# Patient Record
Sex: FEMALE | Race: WHITE | URBAN - METROPOLITAN AREA
[De-identification: names, ages, dates, MRNs, and addresses within clinical notes are randomized per-mention and may not be internally consistent; named-entity substitution may affect disease eponyms.]

---

## 2022-07-11 ENCOUNTER — APPOINTMENT (OUTPATIENT)
Dept: URBAN - METROPOLITAN AREA SURGERY 18 | Age: 62
Setting detail: DERMATOLOGY
End: 2022-07-14

## 2022-07-11 VITALS — SYSTOLIC BLOOD PRESSURE: 133 MMHG | DIASTOLIC BLOOD PRESSURE: 73 MMHG | HEART RATE: 68 BPM

## 2022-07-11 PROBLEM — C44.319 BASAL CELL CARCINOMA OF SKIN OF OTHER PARTS OF FACE: Status: ACTIVE | Noted: 2022-07-11

## 2022-07-11 PROCEDURE — 14041 TIS TRNFR F/C/C/M/N/A/G/H/F: CPT

## 2022-07-11 PROCEDURE — OTHER MOHS SURGERY: OTHER

## 2022-07-11 PROCEDURE — OTHER REFERRAL CORRESPONDENCE: OTHER

## 2022-07-11 PROCEDURE — OTHER COUNSELING: OTHER

## 2022-07-11 PROCEDURE — 17311 MOHS 1 STAGE H/N/HF/G: CPT

## 2022-07-11 PROCEDURE — OTHER PRESCRIPTION: OTHER

## 2022-07-11 PROCEDURE — 17312 MOHS ADDL STAGE: CPT

## 2022-07-11 RX ORDER — OXYCODONE HYDROCHLORIDE AND ACETAMINOPHEN 5; 325 MG/1; MG/1
TABLET ORAL
Qty: 4 | Refills: 0 | Status: ERX | COMMUNITY
Start: 2022-07-11

## 2022-07-11 NOTE — PROCEDURE: MOHS SURGERY
Pt resting on stretcher with eyes closed Melolabial Interpolation Flap Text: A decision was made to reconstruct the defect utilizing an interpolation axial flap and a staged reconstruction.  A telfa template was made of the defect.  This telfa template was then used to outline the melolabial interpolation flap.  The donor area for the pedicle flap was then injected with anesthesia.  The flap was excised through the skin and subcutaneous tissue down to the layer of the underlying musculature.  The pedicle flap was carefully excised within this deep plane to maintain its blood supply.  The edges of the donor site were undermined.   The donor site was closed in a primary fashion.  The pedicle was then rotated into position and sutured.  Once the tube was sutured into place, adequate blood supply was confirmed with blanching and refill.  The pedicle was then wrapped with xeroform gauze and dressed appropriately with a telfa and gauze bandage to ensure continued blood supply and protect the attached pedicle.

## 2022-07-11 NOTE — PROCEDURE: MOHS SURGERY
Consent (Temporal Branch)/Introductory Paragraph: The rationale for Mohs was explained to the patient and consent was obtained. The risks, benefits and alternatives to therapy were discussed in detail. Specifically, the risks of damage to the temporal branch of the facial nerve, infection, scarring, bleeding, prolonged wound healing, incomplete removal, allergy to anesthesia, and recurrence were addressed. Risks of procedure near brow/eyelid were reviewed in detail as well (risks of lid/brow asymmetry, change in lid/brow position that may require additional procedures to correct/improve, etc). Prior to the procedure, the treatment site was clearly identified and confirmed by the patient. All components of Universal Protocol/PAUSE Rule completed.

## 2022-07-20 ENCOUNTER — APPOINTMENT (OUTPATIENT)
Dept: URBAN - METROPOLITAN AREA SURGERY 18 | Age: 62
Setting detail: DERMATOLOGY
End: 2022-07-21

## 2022-07-20 DIAGNOSIS — Z48.817 ENCOUNTER FOR SURGICAL AFTERCARE FOLLOWING SURGERY ON THE SKIN AND SUBCUTANEOUS TISSUE: ICD-10-CM

## 2022-07-20 PROCEDURE — 99024 POSTOP FOLLOW-UP VISIT: CPT

## 2022-07-20 PROCEDURE — OTHER POST-OP WOUND CHECK: OTHER

## 2022-07-20 ASSESSMENT — LOCATION ZONE DERM: LOCATION ZONE: FACE

## 2022-07-20 ASSESSMENT — LOCATION DETAILED DESCRIPTION DERM: LOCATION DETAILED: RIGHT MID TEMPLE

## 2022-07-20 ASSESSMENT — LOCATION SIMPLE DESCRIPTION DERM: LOCATION SIMPLE: RIGHT TEMPLE

## 2022-07-20 NOTE — PROCEDURE: POST-OP WOUND CHECK
Detail Level: Detailed
Additional Comments: Patient presents for one week wound check following Mohs surgery with double advancement flap. Flap site appears to be healing within normal limits, well approximated, well contoured, minimal crusting, without signs/symptoms of infection or poor wound healing. Patient denies any complaints or concerns. Site cleaned with surgical soap and dressed with Vaseline, Telfa Pad, and Hypafix tape. Reviewed with patient continued wound care instructions; keep bandage on and dry for one week then can begin daily dressing changes until crusting has resolved and superficial sutures dissolved.  Reviewed expected appearance as scar line matures over coming weeks to months reviewed in detail. Begin massage at 6 weeks and to continue to monitor for signs/symptoms of infection. Patient to follow up in 6 weeks or sooner if concerns arise.
Add 86152 Cpt? (Important Note: In 2017 The Use Of 26144 Is Being Tracked By Cms To Determine Future Global Period Reimbursement For Global Periods): yes
Wound Evaluated By: Dr. Irvin Mello

## 2022-08-31 ENCOUNTER — APPOINTMENT (OUTPATIENT)
Dept: URBAN - METROPOLITAN AREA SURGERY 18 | Age: 62
Setting detail: DERMATOLOGY
End: 2022-09-05

## 2022-08-31 DIAGNOSIS — Z48.817 ENCOUNTER FOR SURGICAL AFTERCARE FOLLOWING SURGERY ON THE SKIN AND SUBCUTANEOUS TISSUE: ICD-10-CM

## 2022-08-31 PROCEDURE — OTHER POST-OP WOUND CHECK: OTHER

## 2022-08-31 PROCEDURE — 99024 POSTOP FOLLOW-UP VISIT: CPT

## 2022-08-31 ASSESSMENT — LOCATION ZONE DERM: LOCATION ZONE: FACE

## 2022-08-31 ASSESSMENT — LOCATION DETAILED DESCRIPTION DERM: LOCATION DETAILED: RIGHT MID TEMPLE

## 2022-08-31 ASSESSMENT — LOCATION SIMPLE DESCRIPTION DERM: LOCATION SIMPLE: RIGHT TEMPLE

## 2022-08-31 NOTE — PROCEDURE: POST-OP WOUND CHECK
Wound Evaluated By: Dr. Irvin Mello
Detail Level: Detailed
Add 92147 Cpt? (Important Note: In 2017 The Use Of 13510 Is Being Tracked By Cms To Determine Future Global Period Reimbursement For Global Periods): yes
Additional Comments: Patient presents for a 7.5 week post Mohs wound check. Surgical site is fully healed and well approximated. Patient denies any concerns today and is pleased with how the scar looks. Advised patient to be diligent about her UV protection. Patient to follow-up as needed.